# Patient Record
(demographics unavailable — no encounter records)

---

## 2025-02-13 NOTE — HISTORY OF PRESENT ILLNESS
[de-identified] : Patient is a 61-year-old woman who is well-known to me.  She is status post left total knee replacement.  She occasion gets some mild discomfort in the knee but overall she feels well with guards to the knee.  She does continue to have significant pain overall.  No longer focused at the hip.  She never obtained the hip MRI that we previously had discussed but she was seen by spine and had a spine MRI.  Currently low back pain and pain that radiates to the right buttock is most significant for.  She denies numbness and tingling.  She feels limited in her walking because of the pain.  This is limiting her activities.

## 2025-02-13 NOTE — DISCUSSION/SUMMARY
[de-identified] : S/P left, she is bothered third by more chronic pain issues.  She never obtained the hip MRI total knee replacement, progressing well.  But symptoms seem to be much more focused now on the low back.  Therefore, I think a spine evaluation is warranted and we provided contact information for that.  Symptomatic and pain relief, range of motion, activity advancement and precaution strategies reviewed, including use of over-the-counter medications as needed.  We provided information regarding the need for antibiotic prophylaxis for future dental or invasive procedures. We will follow up as scheduled for the knee on a yearly basis but advised them to return sooner if they develops any concerns for an evaluation.  Patient is agreement this plan.  All questions answered.

## 2025-02-13 NOTE — PHYSICAL EXAM
[de-identified] : His exam she is alert and oriented.  She is walking with a short stride shuffling gait with stooped forward posture.  There is no pain with gentle functional range of motion of the hip or with range of motion of the knee which is 0 to 130 degrees.  There is no instability or swelling to the knee.  Neurologically intact [de-identified] : 3 views of the left knee show well aligned total knee arthroplasty with no hardware complication loosening fracture or dislocation.

## 2025-02-13 NOTE — DISCUSSION/SUMMARY
[de-identified] : S/P left, she is bothered third by more chronic pain issues.  She never obtained the hip MRI total knee replacement, progressing well.  But symptoms seem to be much more focused now on the low back.  Therefore, I think a spine evaluation is warranted and we provided contact information for that.  Symptomatic and pain relief, range of motion, activity advancement and precaution strategies reviewed, including use of over-the-counter medications as needed.  We provided information regarding the need for antibiotic prophylaxis for future dental or invasive procedures. We will follow up as scheduled for the knee on a yearly basis but advised them to return sooner if they develops any concerns for an evaluation.  Patient is agreement this plan.  All questions answered.

## 2025-02-13 NOTE — PHYSICAL EXAM
[de-identified] : His exam she is alert and oriented.  She is walking with a short stride shuffling gait with stooped forward posture.  There is no pain with gentle functional range of motion of the hip or with range of motion of the knee which is 0 to 130 degrees.  There is no instability or swelling to the knee.  Neurologically intact [de-identified] : 3 views of the left knee show well aligned total knee arthroplasty with no hardware complication loosening fracture or dislocation.

## 2025-02-13 NOTE — HISTORY OF PRESENT ILLNESS
[de-identified] : Patient is a 61-year-old woman who is well-known to me.  She is status post left total knee replacement.  She occasion gets some mild discomfort in the knee but overall she feels well with guards to the knee.  She does continue to have significant pain overall.  No longer focused at the hip.  She never obtained the hip MRI that we previously had discussed but she was seen by spine and had a spine MRI.  Currently low back pain and pain that radiates to the right buttock is most significant for.  She denies numbness and tingling.  She feels limited in her walking because of the pain.  This is limiting her activities.